# Patient Record
Sex: MALE | Race: WHITE | NOT HISPANIC OR LATINO | Employment: FULL TIME | URBAN - METROPOLITAN AREA
[De-identification: names, ages, dates, MRNs, and addresses within clinical notes are randomized per-mention and may not be internally consistent; named-entity substitution may affect disease eponyms.]

---

## 2024-08-21 ENCOUNTER — OFFICE VISIT (OUTPATIENT)
Dept: GASTROENTEROLOGY | Facility: HOSPITAL | Age: 59
End: 2024-08-21
Payer: COMMERCIAL

## 2024-08-21 DIAGNOSIS — C25.9 FAMILIAL MALIGNANT NEOPLASM OF PANCREAS (MULTI): Primary | ICD-10-CM

## 2024-08-21 PROCEDURE — 99443 PR PHYS/QHP TELEPHONE EVALUATION 21-30 MIN: CPT | Performed by: INTERNAL MEDICINE

## 2024-08-21 NOTE — PROGRESS NOTES
"Subjective   Patient ID: Scooter Davies is a 58 y.o. male who presents for No chief complaint on file..  HPI Anesthesiologist, has HTN, hyperlipidemia. Two inguinal hernias, two rotator cuff repair.     Father pancreas cancer 84, paternal uncle dies at age 62; paternal GM had colon cancer, kidney cancers; father had [employer] test BRCAI/2 [employer] was negative. 5'8\",  160 lbs. Non smoker.    Review of Systems    Objective   Physical Exam    Assessment/Plan   Discussed risks benefits of screening. Results of recent series showing earlier detection and imporved survival.. Also downsides of inadvertent surgery. Will schedule for EUS and monitor with annual imaging.          Tirso Peres MD 08/21/24 9:01 AM   "

## 2024-10-03 ENCOUNTER — TELEPHONE (OUTPATIENT)
Dept: GASTROENTEROLOGY | Facility: HOSPITAL | Age: 59
End: 2024-10-03
Payer: COMMERCIAL

## 2024-10-03 NOTE — TELEPHONE ENCOUNTER
Patient's spouse Adrienne called regarding contacting [facility] back (she stated they received a call that her 's procedure was canceled with Dr. Peres and she was attempting to call [facility] back to reschedule.) She asked that I send [facility] an additional message to please contact her back today.    I sent a secure chat to [facility] providing the patient's spouse call back number.

## 2025-01-09 ENCOUNTER — ANESTHESIA EVENT (OUTPATIENT)
Dept: GASTROENTEROLOGY | Facility: HOSPITAL | Age: 60
End: 2025-01-09
Payer: COMMERCIAL

## 2025-01-09 ENCOUNTER — HOSPITAL ENCOUNTER (OUTPATIENT)
Dept: GASTROENTEROLOGY | Facility: HOSPITAL | Age: 60
Discharge: HOME | End: 2025-01-09
Payer: COMMERCIAL

## 2025-01-09 ENCOUNTER — ANESTHESIA (OUTPATIENT)
Dept: GASTROENTEROLOGY | Facility: HOSPITAL | Age: 60
End: 2025-01-09
Payer: COMMERCIAL

## 2025-01-09 VITALS
BODY MASS INDEX: 24.44 KG/M2 | DIASTOLIC BLOOD PRESSURE: 69 MMHG | RESPIRATION RATE: 15 BRPM | HEART RATE: 80 BPM | HEIGHT: 69 IN | TEMPERATURE: 97.4 F | OXYGEN SATURATION: 98 % | SYSTOLIC BLOOD PRESSURE: 116 MMHG | WEIGHT: 165 LBS

## 2025-01-09 DIAGNOSIS — C25.9 FAMILIAL MALIGNANT NEOPLASM OF PANCREAS (MULTI): ICD-10-CM

## 2025-01-09 PROBLEM — I49.9 DYSRHYTHMIAS: Status: ACTIVE | Noted: 2025-01-09

## 2025-01-09 PROBLEM — K27.9 PEPTIC ULCER: Status: ACTIVE | Noted: 2025-01-09

## 2025-01-09 PROBLEM — E55.9 VITAMIN D DEFICIENCY: Status: ACTIVE | Noted: 2025-01-09

## 2025-01-09 PROBLEM — E78.5 HYPERLIPIDEMIA: Status: ACTIVE | Noted: 2025-01-09

## 2025-01-09 PROBLEM — K27.9 PEPTIC ULCER: Status: RESOLVED | Noted: 2025-01-09 | Resolved: 2025-01-09

## 2025-01-09 PROBLEM — I10 HTN (HYPERTENSION): Status: ACTIVE | Noted: 2025-01-09

## 2025-01-09 PROCEDURE — 7100000009 HC PHASE TWO TIME - INITIAL BASE CHARGE

## 2025-01-09 PROCEDURE — 3700000001 HC GENERAL ANESTHESIA TIME - INITIAL BASE CHARGE

## 2025-01-09 PROCEDURE — 2500000005 HC RX 250 GENERAL PHARMACY W/O HCPCS

## 2025-01-09 PROCEDURE — 43237 ENDOSCOPIC US EXAM ESOPH: CPT | Performed by: INTERNAL MEDICINE

## 2025-01-09 PROCEDURE — 43237 ENDOSCOPIC US EXAM ESOPH: CPT | Performed by: STUDENT IN AN ORGANIZED HEALTH CARE EDUCATION/TRAINING PROGRAM

## 2025-01-09 PROCEDURE — 2720000007 HC OR 272 NO HCPCS

## 2025-01-09 PROCEDURE — 2500000004 HC RX 250 GENERAL PHARMACY W/ HCPCS (ALT 636 FOR OP/ED)

## 2025-01-09 PROCEDURE — A43237 PR ESOPHAGOGASTRODUODENOSCOPY US SCOPE W/ADJ STRXRS: Performed by: STUDENT IN AN ORGANIZED HEALTH CARE EDUCATION/TRAINING PROGRAM

## 2025-01-09 PROCEDURE — 7100000010 HC PHASE TWO TIME - EACH INCREMENTAL 1 MINUTE

## 2025-01-09 PROCEDURE — A43237 PR ESOPHAGOGASTRODUODENOSCOPY US SCOPE W/ADJ STRXRS

## 2025-01-09 PROCEDURE — 3700000002 HC GENERAL ANESTHESIA TIME - EACH INCREMENTAL 1 MINUTE

## 2025-01-09 RX ORDER — DROPERIDOL 2.5 MG/ML
0.62 INJECTION, SOLUTION INTRAMUSCULAR; INTRAVENOUS ONCE AS NEEDED
OUTPATIENT
Start: 2025-01-09

## 2025-01-09 RX ORDER — MIDAZOLAM HYDROCHLORIDE 1 MG/ML
INJECTION INTRAMUSCULAR; INTRAVENOUS AS NEEDED
Status: DISCONTINUED | OUTPATIENT
Start: 2025-01-09 | End: 2025-01-09

## 2025-01-09 RX ORDER — ROSUVASTATIN CALCIUM 10 MG/1
10 TABLET, COATED ORAL DAILY
COMMUNITY

## 2025-01-09 RX ORDER — LIDOCAINE HYDROCHLORIDE 20 MG/ML
SOLUTION OROPHARYNGEAL AS NEEDED
Status: DISCONTINUED | OUTPATIENT
Start: 2025-01-09 | End: 2025-01-09

## 2025-01-09 RX ORDER — PROPOFOL 10 MG/ML
INJECTION, EMULSION INTRAVENOUS AS NEEDED
Status: DISCONTINUED | OUTPATIENT
Start: 2025-01-09 | End: 2025-01-09

## 2025-01-09 RX ORDER — AMLODIPINE BESYLATE 5 MG/1
TABLET ORAL DAILY
COMMUNITY

## 2025-01-09 RX ORDER — LIDOCAINE HYDROCHLORIDE 10 MG/ML
0.1 INJECTION, SOLUTION EPIDURAL; INFILTRATION; INTRACAUDAL; PERINEURAL ONCE
OUTPATIENT
Start: 2025-01-09 | End: 2025-01-09

## 2025-01-09 RX ORDER — ONDANSETRON HYDROCHLORIDE 2 MG/ML
4 INJECTION, SOLUTION INTRAVENOUS ONCE AS NEEDED
OUTPATIENT
Start: 2025-01-09

## 2025-01-09 RX ORDER — GLYCOPYRROLATE 0.2 MG/ML
INJECTION INTRAMUSCULAR; INTRAVENOUS AS NEEDED
Status: DISCONTINUED | OUTPATIENT
Start: 2025-01-09 | End: 2025-01-09

## 2025-01-09 RX ORDER — OMEPRAZOLE 20 MG/1
20 CAPSULE, DELAYED RELEASE ORAL
COMMUNITY

## 2025-01-09 RX ORDER — TELMISARTAN 20 MG/1
20 TABLET ORAL DAILY
COMMUNITY

## 2025-01-09 RX ADMIN — GLYCOPYRROLATE 0.2 MG: 0.2 INJECTION, SOLUTION INTRAMUSCULAR; INTRAVENOUS at 10:27

## 2025-01-09 RX ADMIN — MIDAZOLAM HYDROCHLORIDE 2 MG: 1 INJECTION, SOLUTION INTRAMUSCULAR; INTRAVENOUS at 10:22

## 2025-01-09 RX ADMIN — PROPOFOL 70 MG: 10 INJECTION, EMULSION INTRAVENOUS at 10:30

## 2025-01-09 RX ADMIN — LIDOCAINE HYDROCHLORIDE 10 ML: 20 SOLUTION ORAL; TOPICAL at 10:21

## 2025-01-09 RX ADMIN — PROPOFOL 200 MCG/KG/MIN: 10 INJECTION, EMULSION INTRAVENOUS at 10:31

## 2025-01-09 RX ADMIN — SODIUM CHLORIDE, SODIUM LACTATE, POTASSIUM CHLORIDE, AND CALCIUM CHLORIDE: 600; 310; 30; 20 INJECTION, SOLUTION INTRAVENOUS at 10:25

## 2025-01-09 ASSESSMENT — COLUMBIA-SUICIDE SEVERITY RATING SCALE - C-SSRS: 1. IN THE PAST MONTH, HAVE YOU WISHED YOU WERE DEAD OR WISHED YOU COULD GO TO SLEEP AND NOT WAKE UP?: NO

## 2025-01-09 ASSESSMENT — PAIN SCALES - GENERAL
PAINLEVEL_OUTOF10: 0 - NO PAIN

## 2025-01-09 ASSESSMENT — PAIN - FUNCTIONAL ASSESSMENT
PAIN_FUNCTIONAL_ASSESSMENT: 0-10

## 2025-01-09 NOTE — ANESTHESIA PREPROCEDURE EVALUATION
Patient: Scooter Davies    Procedure Information       Date/Time: 01/09/25 0930    Scheduled providers: Tirso Peres MD    Procedure: ENDOSCOPIC ULTRASOUND (UPPER)    Location: [facility]            Relevant Problems   Cardiac   (+) Dysrhythmias (RBBB)   (+) HTN (hypertension)   (+) Hyperlipidemia      GI   (+) Peptic ulcer (Resolved)      Liver   (+) Familial malignant neoplasm of pancreas (Multi)     1. Annual physical exam  HgbA1C       2. Essential hypertension  CBC with Differential     Comprehensive Metabolic Panel     TSH       3. Mixed hyperlipidemia  Lipid Panel       4. Raynaud's phenomenon without gangrene          5. Family history of pancreatic cancer          6. Hypovitaminosis D  Vitamin D 25 OH (Total)       7. H/O peptic ulcer          8. H/O supraventricular tachycardia          9. Right bundle branch block          10. Prostate cancer screening  PSA       Clinical information reviewed:    Allergies                NPO Detail:  NPO/Void Status  Date of Last Liquid: 01/09/25  Time of Last Liquid: 0630  Date of Last Solid: 01/08/25  Time of Last Solid: 2100  Last Intake Type: Clear fluids; Food  Time of Last Void: 0730         PHYSICAL EXAM    Anesthesia Plan    History of general anesthesia?: yes  History of complications of general anesthesia?: no    ASA 2     MAC     intravenous induction   Anesthetic plan and risks discussed with patient.  Use of blood products discussed with patient who.    Plan discussed with CRNA and attending.

## 2025-01-09 NOTE — ANESTHESIA POSTPROCEDURE EVALUATION
Patient: Scooter Davies    Procedure Summary       Date: 01/09/25 Room / Location: [facility]    Anesthesia Start: 1025 Anesthesia Stop: 1105    Procedure: ENDOSCOPIC ULTRASOUND (UPPER) Diagnosis: Familial malignant neoplasm of pancreas (Multi)    Scheduled Providers: Tirso Peres MD Responsible Provider: Genesis Serrato MD    Anesthesia Type: MAC ASA Status: 2            Anesthesia Type: MAC    Vitals Value Taken Time   /69 01/09/25 1133   Temp 36.3 °C (97.4 °F) 01/09/25 1103   Pulse 80 01/09/25 1133   Resp 15 01/09/25 1133   SpO2 98 % 01/09/25 1133       Anesthesia Post Evaluation    Patient location during evaluation: PACU  Patient participation: complete - patient participated  Level of consciousness: awake and alert  Pain management: adequate  Airway patency: patent  Cardiovascular status: acceptable  Respiratory status: acceptable  Hydration status: acceptable  Postoperative Nausea and Vomiting: none        There were no known notable events for this encounter.

## 2025-01-09 NOTE — DISCHARGE INSTRUCTIONS
Patient Instructions after an endoscopy/EUS      The anesthetics, sedatives or narcotics which were given to you today will be acting in your body for the next 24 hours, so you might feel a little sleepy or groggy.  This feeling should slowly wear off. Carefully read and follow the instructions.     You received sedation today:  - Do not drive or operate any machinery or power tools of any kind.   - No alcoholic beverages today, not even beer or wine.  - Do not make any important decisions or sign any legal documents.  - No over the counter medications that contain alcohol or that may cause drowsiness.  - Do not make any important decisions or sign any legal documents.    While it is common to experience mild to moderate abdominal distention, gas, or belching after your procedure, if any of these symptoms occur following discharge from the GI Lab or within one week of having your procedure, call the [facility] to be advised whether a visit to your nearest Urgent Care or Emergency Department is indicated.  Take this paper with you if you go.     - If you develop an allergic reaction to the medications that were given during your procedure such as difficulty breathing, rash, hives, severe nausea, vomiting or lightheadedness.- If you experience chest pain, shortness of breath, severe abdominal pain, fevers and chills.    -If you develop signs and symptoms of bleeding such as blood in your spit, if your stools turn black, tarry, or bloody    - If you have not urinated within 8 hours following your procedure.- If your IV site becomes painful, red, inflamed, or looks infected.

## 2025-01-09 NOTE — H&P
Subjective     History of Present Illness:   Scooter Davies is a 59 y.o. male who presents to endoscopy    Physical Exam  General: not in acute distress  CV: regular rate and rhythm  Resp: non-labored breathing

## 2025-02-07 ENCOUNTER — TELEPHONE (OUTPATIENT)
Dept: GASTROENTEROLOGY | Facility: HOSPITAL | Age: 60
End: 2025-02-07
Payer: COMMERCIAL

## 2025-02-07 NOTE — TELEPHONE ENCOUNTER
Patient spouse called Dr. Peres's office asking for a CPT code to be changes from EUS procedure due to the bill that the patient received.     Spouse was provided the customer service billing phone number as instructed by management to provide patient's with any questions related to billing.